# Patient Record
Sex: FEMALE | Employment: UNEMPLOYED | URBAN - METROPOLITAN AREA
[De-identification: names, ages, dates, MRNs, and addresses within clinical notes are randomized per-mention and may not be internally consistent; named-entity substitution may affect disease eponyms.]

---

## 2018-04-22 ENCOUNTER — HOSPITAL ENCOUNTER (EMERGENCY)
Facility: HOSPITAL | Age: 58
Discharge: HOME | End: 2018-04-22
Attending: EMERGENCY MEDICINE
Payer: MEDICAID

## 2018-04-22 VITALS
HEART RATE: 75 BPM | HEIGHT: 66 IN | TEMPERATURE: 99 F | RESPIRATION RATE: 18 BRPM | DIASTOLIC BLOOD PRESSURE: 85 MMHG | SYSTOLIC BLOOD PRESSURE: 143 MMHG | OXYGEN SATURATION: 99 % | WEIGHT: 181 LBS | BODY MASS INDEX: 29.09 KG/M2

## 2018-04-22 DIAGNOSIS — R42 DIZZINESS: ICD-10-CM

## 2018-04-22 LAB
ALBUMIN SERPL BCP-MCNC: 3.7 G/DL
ALP SERPL-CCNC: 73 U/L
ALT SERPL W/O P-5'-P-CCNC: 22 U/L
ANION GAP SERPL CALC-SCNC: 8 MMOL/L
AST SERPL-CCNC: 19 U/L
BASOPHILS # BLD AUTO: 0.01 K/UL
BASOPHILS NFR BLD: 0.3 %
BILIRUB SERPL-MCNC: 0.4 MG/DL
BILIRUB UR QL STRIP: NEGATIVE
BUN SERPL-MCNC: 8 MG/DL
CALCIUM SERPL-MCNC: 9.4 MG/DL
CHLORIDE SERPL-SCNC: 106 MMOL/L
CLARITY UR: CLEAR
CO2 SERPL-SCNC: 25 MMOL/L
COLOR UR: YELLOW
CREAT SERPL-MCNC: 0.8 MG/DL
DIFFERENTIAL METHOD: ABNORMAL
EOSINOPHIL # BLD AUTO: 0.1 K/UL
EOSINOPHIL NFR BLD: 3.1 %
ERYTHROCYTE [DISTWIDTH] IN BLOOD BY AUTOMATED COUNT: 13.8 %
EST. GFR  (AFRICAN AMERICAN): >60 ML/MIN/1.73 M^2
EST. GFR  (NON AFRICAN AMERICAN): >60 ML/MIN/1.73 M^2
GLUCOSE SERPL-MCNC: 111 MG/DL
GLUCOSE UR QL STRIP: NEGATIVE
HCT VFR BLD AUTO: 37 %
HGB BLD-MCNC: 12.4 G/DL
HGB UR QL STRIP: NEGATIVE
KETONES UR QL STRIP: NEGATIVE
LEUKOCYTE ESTERASE UR QL STRIP: NEGATIVE
LIPASE SERPL-CCNC: 21 U/L
LYMPHOCYTES # BLD AUTO: 1 K/UL
LYMPHOCYTES NFR BLD: 30.4 %
MCH RBC QN AUTO: 28.2 PG
MCHC RBC AUTO-ENTMCNC: 33.5 G/DL
MCV RBC AUTO: 84 FL
MICROSCOPIC COMMENT: NORMAL
MONOCYTES # BLD AUTO: 0.2 K/UL
MONOCYTES NFR BLD: 7.4 %
NEUTROPHILS # BLD AUTO: 1.9 K/UL
NEUTROPHILS NFR BLD: 58.8 %
NITRITE UR QL STRIP: NEGATIVE
PH UR STRIP: 5 [PH] (ref 5–8)
PLATELET # BLD AUTO: 150 K/UL
PMV BLD AUTO: 11.5 FL
POTASSIUM SERPL-SCNC: 4 MMOL/L
PROT SERPL-MCNC: 7.2 G/DL
PROT UR QL STRIP: NEGATIVE
RBC # BLD AUTO: 4.39 M/UL
SODIUM SERPL-SCNC: 139 MMOL/L
SP GR UR STRIP: 1.01 (ref 1–1.03)
SQUAMOUS #/AREA URNS HPF: 2 /HPF
URN SPEC COLLECT METH UR: NORMAL
UROBILINOGEN UR STRIP-ACNC: NEGATIVE EU/DL
WBC # BLD AUTO: 3.26 K/UL
WBC #/AREA URNS HPF: 2 /HPF (ref 0–5)

## 2018-04-22 PROCEDURE — 81000 URINALYSIS NONAUTO W/SCOPE: CPT

## 2018-04-22 PROCEDURE — 96360 HYDRATION IV INFUSION INIT: CPT

## 2018-04-22 PROCEDURE — 80053 COMPREHEN METABOLIC PANEL: CPT

## 2018-04-22 PROCEDURE — 93010 ELECTROCARDIOGRAM REPORT: CPT | Mod: ,,, | Performed by: INTERNAL MEDICINE

## 2018-04-22 PROCEDURE — 83690 ASSAY OF LIPASE: CPT

## 2018-04-22 PROCEDURE — 93005 ELECTROCARDIOGRAM TRACING: CPT

## 2018-04-22 PROCEDURE — 25000003 PHARM REV CODE 250: Performed by: PHYSICIAN ASSISTANT

## 2018-04-22 PROCEDURE — 99284 EMERGENCY DEPT VISIT MOD MDM: CPT | Mod: 25

## 2018-04-22 PROCEDURE — 85025 COMPLETE CBC W/AUTO DIFF WBC: CPT

## 2018-04-22 PROCEDURE — 96361 HYDRATE IV INFUSION ADD-ON: CPT

## 2018-04-22 RX ORDER — MECLIZINE HCL 12.5 MG 12.5 MG/1
12.5 TABLET ORAL 3 TIMES DAILY PRN
Qty: 20 TABLET | Refills: 0 | Status: SHIPPED | OUTPATIENT
Start: 2018-04-22

## 2018-04-22 RX ADMIN — SODIUM CHLORIDE 1000 ML: 9 INJECTION, SOLUTION INTRAVENOUS at 02:04

## 2018-04-22 NOTE — ED PROVIDER NOTES
"Encounter Date: 4/22/2018    SCRIBE #1 NOTE: I, Masha Cordero, am scribing for, and in the presence of,  Thai Torres MD. I have scribed the following portions of the note - Other sections scribed: HPI, ROS, PE.       History     Chief Complaint   Patient presents with    Dizziness     " dizziness and stomach upset" reports diarrhea last nighrt, reports 3 episodes of diarrhea last night      CC: Dizziness    HPI: 57 year old female presents to the ED c/o intermittent dizziness that has been ongoing for a couple of months. Pt reports episode of dizziness began this morning at 10:30am while she was cooking. Episode lasted for approximately 1 minute. The dizziness was alleviated after the pt sat down. Pt reports the dizziness feels like the room is spinning. She reports occassionally feeling light-headed, like she is going to fall. Pt denies taking any daily medications or recent OTC medications. No hx of DM or HTN. Pt denies fever, chills, chest pain, SOB, N/V/D, difficulty swallowing, speech difficulty, numbness, and weakness. No recent illnesses. Pt reports she is currently asymptomatic.      The history is provided by the patient. No  was used.     Review of patient's allergies indicates:   Allergen Reactions    Shellfish containing products      History reviewed. No pertinent past medical history.  History reviewed. No pertinent surgical history.  History reviewed. No pertinent family history.  Social History   Substance Use Topics    Smoking status: Never Smoker    Smokeless tobacco: Never Used    Alcohol use Yes      Comment: occasionally      Review of Systems   Constitutional: Negative for chills, diaphoresis and fever.   HENT: Negative for ear pain, sore throat and trouble swallowing.    Eyes: Negative for pain and visual disturbance.   Respiratory: Negative for cough and shortness of breath.    Cardiovascular: Negative for chest pain.   Gastrointestinal: Negative for " abdominal pain, diarrhea, nausea and vomiting.   Genitourinary: Negative for dysuria.   Musculoskeletal: Negative for back pain.   Skin: Negative for rash.   Neurological: Positive for dizziness and light-headedness. Negative for speech difficulty, weakness, numbness and headaches.       Physical Exam     Initial Vitals [04/22/18 1328]   BP Pulse Resp Temp SpO2   (!) 142/83 75 20 98.6 °F (37 °C) 96 %      MAP       102.67         Physical Exam    Nursing note and vitals reviewed.  Constitutional: She appears well-developed and well-nourished.  Non-toxic appearance. She does not appear ill.   HENT:   Head: Normocephalic and atraumatic.   Eyes: EOM are normal.   Minimal arcus senilis   Neck: Neck supple. No thyromegaly present. Carotid bruit is not present.   Cardiovascular: Normal rate and regular rhythm.   Pulmonary/Chest: Effort normal and breath sounds normal. No respiratory distress.   Abdominal: Soft. Normal appearance and bowel sounds are normal. She exhibits no distension. There is no tenderness.   Musculoskeletal: Normal range of motion.   Neurological: She is alert.   Skin: Skin is warm and dry.   Psychiatric: She has a normal mood and affect.         ED Course   Procedures  Labs Reviewed   CBC W/ AUTO DIFFERENTIAL - Abnormal; Notable for the following:        Result Value    WBC 3.26 (*)     Mono # 0.2 (*)     All other components within normal limits   COMPREHENSIVE METABOLIC PANEL - Abnormal; Notable for the following:     Glucose 111 (*)     All other components within normal limits   LIPASE   URINALYSIS   URINALYSIS MICROSCOPIC     EKG Readings: (Independently Interpreted)   Initial Reading: No STEMI. Rhythm: Normal Sinus Rhythm. Heart Rate: 67. Ectopy: No Ectopy. Conduction: Normal. ST Segments: Normal ST Segments. Axis: Normal. Clinical Impression: Normal Sinus Rhythm Other Impression: diffuse flat t waves          Medical Decision Making:   Intermittent and mostly positional. Less c/w cva. Not c/w  acute labyrinthitis.             Scribe Attestation:   Scribe #1: I performed the above scribed service and the documentation accurately describes the services I performed. I attest to the accuracy of the note.    Attending Attestation:           Physician Attestation for Scribe:  Physician Attestation Statement for Scribe #1: I, Thai Torres MD, reviewed documentation, as scribed by Masha Cordero in my presence, and it is both accurate and complete.                    Clinical Impression:   The encounter diagnosis was Dizziness.                           Thai Torres MD  04/22/18 [id number]

## 2018-04-22 NOTE — ED TRIAGE NOTES
Patient presents to the ER with daughter via personal vehicle. Patient presents with lightheadedness and dizziness. Reports nausea but denies vomiting. Symptoms have been going on for a week. Denies pain.

## 2024-12-19 ENCOUNTER — HOSPITAL ENCOUNTER (EMERGENCY)
Facility: OTHER | Age: 64
Discharge: HOME | End: 2024-12-19
Attending: EMERGENCY MEDICINE
Payer: MEDICAID

## 2024-12-19 VITALS
SYSTOLIC BLOOD PRESSURE: 163 MMHG | WEIGHT: 172 LBS | DIASTOLIC BLOOD PRESSURE: 76 MMHG | RESPIRATION RATE: 18 BRPM | TEMPERATURE: 98 F | OXYGEN SATURATION: 98 % | HEIGHT: 67 IN | HEART RATE: 61 BPM | BODY MASS INDEX: 27 KG/M2

## 2024-12-19 DIAGNOSIS — J02.0 STREP THROAT: Primary | ICD-10-CM

## 2024-12-19 LAB
CTP QC/QA: YES
CTP QC/QA: YES
GROUP A STREP, MOLECULAR: POSITIVE
POC MOLECULAR INFLUENZA A AGN: NEGATIVE
POC MOLECULAR INFLUENZA B AGN: NEGATIVE
SARS-COV-2 RDRP RESP QL NAA+PROBE: NEGATIVE

## 2024-12-19 PROCEDURE — 87635 SARS-COV-2 COVID-19 AMP PRB: CPT | Performed by: NURSE PRACTITIONER

## 2024-12-19 PROCEDURE — 99283 EMERGENCY DEPT VISIT LOW MDM: CPT

## 2024-12-19 PROCEDURE — 25000003 PHARM REV CODE 250: Performed by: NURSE PRACTITIONER

## 2024-12-19 PROCEDURE — 87651 STREP A DNA AMP PROBE: CPT | Performed by: EMERGENCY MEDICINE

## 2024-12-19 RX ORDER — PENICILLIN V POTASSIUM 500 MG/1
500 TABLET, FILM COATED ORAL 2 TIMES DAILY
Qty: 20 TABLET | Refills: 0 | Status: SHIPPED | OUTPATIENT
Start: 2024-12-19 | End: 2024-12-29

## 2024-12-19 RX ORDER — PENICILLIN V POTASSIUM 500 MG/1
500 TABLET, FILM COATED ORAL
Status: COMPLETED | OUTPATIENT
Start: 2024-12-19 | End: 2024-12-19

## 2024-12-19 RX ADMIN — PENICILLIN V POTASSIUM 500 MG: 500 TABLET, FILM COATED ORAL at 10:12

## 2024-12-19 NOTE — ED PROVIDER NOTES
Encounter Date: 12/19/2024       History     Chief Complaint   Patient presents with    General Illness     Sore throat, congestion x1.5 weeks.      Chief complaint: Upper respiratory infection symptoms     History of present illness:  Patient is a 64-year-old female who reports 1 week of congestion runny nose sinus pressure scratchy throat ear fullness occasional wheezing and shortness of breath due to upper airway congestion and constipation for the last 3 days.  She denies fever diarrhea nausea vomiting and all other symptoms.  She has been using tea, has taken 2 BC powders, and a single dose of strep throat antibiotic from her daughter.  She reports no resolution of her symptoms with these remedies.    The history is provided by the patient. No  was used.     Review of patient's allergies indicates:   Allergen Reactions    Shellfish containing products      History reviewed. No pertinent past medical history.  History reviewed. No pertinent surgical history.  No family history on file.  Social History     Tobacco Use    Smoking status: Never    Smokeless tobacco: Never   Substance Use Topics    Alcohol use: Yes     Comment: occasionally     Drug use: No     Review of Systems   HENT:  Positive for congestion, ear pain, rhinorrhea, sinus pressure and sore throat.    Respiratory:  Positive for shortness of breath and wheezing.    Gastrointestinal:  Positive for constipation.       Physical Exam     Initial Vitals [12/19/24 0910]   BP Pulse Resp Temp SpO2   137/79 64 19 98.2 °F (36.8 °C) 98 %      MAP       --         Physical Exam    Nursing note and vitals reviewed.  Constitutional: She appears well-developed and well-nourished.   HENT:   Head: Normocephalic and atraumatic.   Right Ear: Hearing, tympanic membrane, external ear and ear canal normal.   Left Ear: Hearing, tympanic membrane, external ear and ear canal normal.   Nose: Nose normal. No mucosal edema or rhinorrhea. No epistaxis. Right  sinus exhibits no maxillary sinus tenderness and no frontal sinus tenderness. Left sinus exhibits no maxillary sinus tenderness and no frontal sinus tenderness. Mouth/Throat: Uvula is midline, oropharynx is clear and moist and mucous membranes are normal. No oral lesions. Normal dentition.   Eyes: Conjunctivae and EOM are normal. Pupils are equal, round, and reactive to light. Right eye exhibits no discharge. Left eye exhibits no discharge.   Neck:   Normal range of motion.  Cardiovascular:  Regular rhythm, S1 normal, S2 normal and normal heart sounds.     Exam reveals no gallop.       No murmur heard.  Pulmonary/Chest: Effort normal and breath sounds normal. No respiratory distress. She has no decreased breath sounds. She has no wheezes. She has no rhonchi. She has no rales.   Abdominal: She exhibits no distension.   Musculoskeletal:         General: Normal range of motion.      Cervical back: Normal range of motion.     Neurological: She is alert and oriented to person, place, and time.   Skin: Skin is dry. Capillary refill takes less than 2 seconds.         ED Course   Procedures  Labs Reviewed   GROUP A STREP, MOLECULAR - Abnormal       Result Value    Group A Strep, Molecular Positive (*)    SARS-COV-2 RDRP GENE    POC Rapid COVID Negative       Acceptable Yes     POCT INFLUENZA A/B MOLECULAR    POC Molecular Influenza A Ag Negative      POC Molecular Influenza B Ag Negative       Acceptable Yes            Imaging Results    None          Medications   penicillin v potassium tablet 500 mg (500 mg Oral Given 12/19/24 1059)     Medical Decision Making  Patient is a 64-year-old female who reports 1 week of congestion runny nose sinus pressure scratchy throat ear fullness occasional wheezing and shortness of breath due to upper airway congestion and constipation for the last 3 days.  She denies fever diarrhea nausea vomiting and all other symptoms.  She has been using tea, has taken 2  "BC powders, and a single dose of strep throat antibiotic from her daughter.  She reports no resolution of her symptoms with these remedies.    On physical examination the patient is afebrile nontoxic in no apparent distress breath sounds are clear to auscultation heart sounds without abnormality skin warm dry and intact.  Normal HEENT exam.      Differential diagnosis includes COVID-19 influenza strep throat common cold    Problems Addressed:  Strep throat: acute illness or injury     Details: Patient opted for p.o. antibiotics penicillin VK was given in the ER she was discharged on same.    Amount and/or Complexity of Data Reviewed  Labs: ordered. Decision-making details documented in ED Course.  Discussion of management or test interpretation with external provider(s): Vital signs at the time of disposition were:  /79 (BP Location: Left arm)   Pulse 64   Temp 98.2 °F (36.8 °C) (Oral)   Resp 19   Ht 5' 7" (1.702 m)   Wt 78 kg (172 lb)   SpO2 98%   BMI 26.94 kg/m²       See AVS for additional recommendations. Medications listed herein were prescribed after reviewing the patient's allergies, medication list, history, most recent laboratories as available.  Referrals below were provided after reviewing the patient's previous medical providers. She understands she  should return for any worsening or changes in condition.  Prior to discharge the patient was asked if she  had any additional concerns or complaints and she declined. The patient was given an opportunity to ask questions and all were answered to her satisfaction.     Risk  OTC drugs.  Prescription drug management.  Diagnosis or treatment significantly limited by social determinants of health.               ED Course as of 12/19/24 2052   u Dec 19, 2024   0934 BP: 137/79 [VC]   0934 Temp: 98.2 °F (36.8 °C) [VC]   0934 Temp Source: Oral [VC]   0934 Pulse: 64 [VC]   0934 Resp: 19 [VC]   0934 SpO2: 98 % [VC]   0947 Group A Strep, Molecular(!): " Positive [VC]   1031 POC Molecular Influenza A Ag: Negative [VC]   1031 POC Molecular Influenza B Ag: Negative [VC]   1031 SARS-CoV-2 RNA, Amplification, Qual: Negative [VC]      ED Course User Index  [VC] Ga Krishnamurthy DNP                           Clinical Impression:  Final diagnoses:  [J02.0] Strep throat (Primary)          ED Disposition Condition    Discharge Stable          ED Prescriptions       Medication Sig Dispense Start Date End Date Auth. Provider    penicillin v potassium (VEETID) 500 MG tablet Take 1 tablet (500 mg total) by mouth 2 (two) times a day. for 10 days 20 tablet 12/19/2024 12/29/2024 Ga Krishnamurthy DNP          Follow-up Information       Follow up With Specialties Details Why Contact [facility] -  Schedule an appointment as soon as possible for a visit   [address]  [place] [address]  [phone]               Ga Krishnamurthy DNP  12/19/24 1031       Ga Krishnamurthy DNP  12/19/24 2052

## 2024-12-19 NOTE — ED TRIAGE NOTES
Pt reports that for the past week she was congested, ear fullness, and scratchy throat. She denies fever and took an antibiotic that her daughter had.

## 2024-12-19 NOTE — DISCHARGE INSTRUCTIONS
Take antibiotics as ordered.     Cepacol Lozenges as directed on package.  Warm fluids and warm saltwater gargles.         Return to the Emergency Department for any worsening, change in condition, or any emergent concerns.

## 2024-12-19 NOTE — Clinical Note
"Antwan Trimbleradha Fernandez was seen and treated in our emergency department on 12/19/2024.  She may return to work on 12/23/2024.       If you have any questions or concerns, please don't hesitate to call.      Ga Krishnamurthy, DNP"

## 2024-12-19 NOTE — FIRST PROVIDER EVALUATION
Emergency Department TeleTriage Encounter Note      CHIEF COMPLAINT    Chief Complaint   Patient presents with    General Illness     Sore throat, congestion x1.5 weeks.        VITAL SIGNS   Initial Vitals [12/19/24 0910]   BP Pulse Resp Temp SpO2   137/79 64 19 98.2 °F (36.8 °C) 98 %      MAP       --            ALLERGIES    Review of patient's allergies indicates:   Allergen Reactions    Shellfish containing products        PROVIDER TRIAGE NOTE  This is a teletriage evaluation of a 64 y.o. female presenting to the ED complaining of general illness. Patient reports sore throat, congestion, cough, sinus pressure. Patient took OTC NSAIDs and an unknown antibiotic.     Patient is alert and oriented. She speaks in complete sentences. She is sitting upright in the chair in no distress.       Initial orders will be placed and care will be transferred to an alternate provider when patient is roomed for a full evaluation. Any additional orders and the final disposition will be determined by that provider.         ORDERS  Labs Reviewed   SARS-COV-2 RDRP GENE   POCT INFLUENZA A/B MOLECULAR   POCT STREP A MOLECULAR       ED Orders (720h ago, onward)      Start Ordered     Status Ordering Provider    12/19/24 0912 12/19/24 0911  POCT COVID-19 Rapid Screening  Once         Ordered KELLIE DANIELSNE A.    12/19/24 0912 12/19/24 0911  POCT Influenza A/B Molecular  Once         Ordered FRANCES ESTRELLA A.    12/19/24 0912 12/19/24 0911  POCT Strep A, Molecular  Once         Ordered FRANCES ESTRELLA JEFF.              Virtual Visit Note: The provider triage portion of this emergency department evaluation and documentation was performed via [employer], a HIPAA-compliant telemedicine application, in concert with a tele-presenter in the room. A face to face patient evaluation with one of my colleagues will occur once the patient is placed in an emergency department room.      DISCLAIMER: This note was prepared with M*Modal voice  recognition transcription software. Garbled syntax, mangled pronouns, and other bizarre constructions may be attributed to that software system.

## 2025-04-27 ENCOUNTER — HOSPITAL ENCOUNTER (EMERGENCY)
Facility: OTHER | Age: 65
Discharge: HOME | End: 2025-04-27
Attending: EMERGENCY MEDICINE
Payer: MEDICAID

## 2025-04-27 VITALS
WEIGHT: 170 LBS | DIASTOLIC BLOOD PRESSURE: 79 MMHG | HEIGHT: 66 IN | OXYGEN SATURATION: 97 % | SYSTOLIC BLOOD PRESSURE: 142 MMHG | TEMPERATURE: 98 F | BODY MASS INDEX: 27.32 KG/M2 | HEART RATE: 60 BPM | RESPIRATION RATE: 18 BRPM

## 2025-04-27 DIAGNOSIS — R51.9 FRONTAL HEADACHE: Primary | ICD-10-CM

## 2025-04-27 PROCEDURE — 96372 THER/PROPH/DIAG INJ SC/IM: CPT | Performed by: EMERGENCY MEDICINE

## 2025-04-27 PROCEDURE — 63600175 PHARM REV CODE 636 W HCPCS: Mod: JZ,TB | Performed by: EMERGENCY MEDICINE

## 2025-04-27 PROCEDURE — 99284 EMERGENCY DEPT VISIT MOD MDM: CPT | Mod: 25

## 2025-04-27 RX ORDER — METOCLOPRAMIDE 10 MG/1
10 TABLET ORAL EVERY 6 HOURS PRN
Qty: 20 TABLET | Refills: 0 | Status: SHIPPED | OUTPATIENT
Start: 2025-04-27

## 2025-04-27 RX ORDER — BUTALBITAL, ACETAMINOPHEN AND CAFFEINE 50; 325; 40 MG/1; MG/1; MG/1
1 TABLET ORAL EVERY 4 HOURS PRN
Qty: 15 TABLET | Refills: 0 | Status: SHIPPED | OUTPATIENT
Start: 2025-04-27 | End: 2025-05-27

## 2025-04-27 RX ORDER — IBUPROFEN 600 MG/1
600 TABLET ORAL EVERY 6 HOURS PRN
Qty: 20 TABLET | Refills: 0 | Status: SHIPPED | OUTPATIENT
Start: 2025-04-27

## 2025-04-27 RX ORDER — KETOROLAC TROMETHAMINE 30 MG/ML
30 INJECTION, SOLUTION INTRAMUSCULAR; INTRAVENOUS
Status: COMPLETED | OUTPATIENT
Start: 2025-04-27 | End: 2025-04-27

## 2025-04-27 RX ADMIN — KETOROLAC TROMETHAMINE 30 MG: 30 INJECTION, SOLUTION INTRAMUSCULAR at 05:04

## 2025-04-27 NOTE — DISCHARGE INSTRUCTIONS
I would also recommend using Zyrtec over-the-counter.  10 mg daily.    Follow up with your primary care physician for re-evaluation.

## 2025-04-27 NOTE — ED TRIAGE NOTES
Pt presented to the ed c/o headache that started a few day ago pt is aaox4 with no acute distress noted

## 2025-04-27 NOTE — Clinical Note
"Antwan Huang" Jim was seen and treated in our emergency department on 4/27/2025.  She may return to work on 05/02/2025.       If you have any questions or concerns, please don't hesitate to call.      raul ALARCON    "

## 2025-04-27 NOTE — ED PROVIDER NOTES
"     Source of History:  The patient    Chief complaint:  Migraine (Patient presents to the ED with complaints of having a frontal migraine headache that started x 2 days ago. Symptoms include sensitivity to the light and states having had N/V when pain started x 2 days ago. )      HPI:  Antwan Fernandez is a 64 y.o. female  who complains of frontal headache over the last 2 days.  States she has a history of sinusitis concerned of this.  Denies any recent URI symptoms.  No purulent discharge noted.  No ear pain.  States she is sensitive to light.  No fevers or chills.    This is the extent to the patients complaints today here in the emergency department.    ROS:   See HPI.    Review of patient's allergies indicates:   Allergen Reactions    Shellfish containing products        PMH:  As per HPI and below:  No past medical history on file.  No past surgical history on file.    Social History[1]    Physical Exam:    BP (!) 159/94 (BP Location: Right arm)   Pulse 69   Temp 98.1 °F (36.7 °C) (Oral)   Resp 18   Ht 5' 6" (1.676 m)   Wt 77.1 kg (170 lb)   SpO2 97%   BMI 27.44 kg/m²   Nursing note and vital signs reviewed.  Constitutional:  Pleasant and smiling with easy conversation No acute distress.  Nontoxic  Eyes: No conjunctival injection.  Extraocular muscles are intact.  Pupils are equally round reactive to light.  ENT:  Tenderness to palpation over the frontal sinuses and forehead.  Skin: No rashes seen.    Neuro: alert and oriented x3      I decided to obtain the patient's medical records.  Summary of Medical Records:        Differential Dx/ MDM/ Workup:  I believe the patient has an unspecified headache based upon the history and physical exam.  Likely musculoskeletal.  The patient has no focal weakness, numbness, meningismus, other focal neurologic deficit, history of trauma, fevers, elevated blood pressure to suggest meningitis, subarachnoid hemorrhage, TIA, stroke, mass, or hypertensive urgency. I do not " feel a CT of the brain or blood work are necessary at this time.    Patient did ask about needing for antibiotics.  Even if this was a sinusitis symptoms have all been going on for 2 days.  I did discuss with her antibiotic stewardship and recommendations and she understands and agrees with the plan of care.                     Diagnostic Impression:    1. Frontal headache         ED Disposition Condition    Discharge Stable            ED Prescriptions       Medication Sig Dispense Start Date End Date Auth. Provider    ibuprofen (ADVIL,MOTRIN) 600 MG tablet Take 1 tablet (600 mg total) by mouth every 6 (six) hours as needed for Pain. 20 tablet 4/27/2025 -- José Miguel Mccormick, DO    metoclopramide HCl (REGLAN) 10 MG tablet Take 1 tablet (10 mg total) by mouth every 6 (six) hours as needed (headache). 20 tablet 4/27/2025 -- José Miguel Mccormick, DO    butalbital-acetaminophen-caffeine -40 mg (FIORICET, ESGIC) -40 mg per tablet Take 1 tablet by mouth every 4 (four) hours as needed for Pain. 15 tablet 4/27/2025 5/27/2025 José Miguel Mccormick, DO          Follow-up Information    None            [1]   Social History  Tobacco Use    Smoking status: Never    Smokeless tobacco: Never   Substance Use Topics    Alcohol use: Yes     Comment: occasionally     Drug use: No        José Miguel Mccormick,   04/27/25 0570

## 2025-05-10 ENCOUNTER — HOSPITAL ENCOUNTER (EMERGENCY)
Facility: OTHER | Age: 65
Discharge: HOME | End: 2025-05-10
Attending: EMERGENCY MEDICINE
Payer: MEDICAID

## 2025-05-10 VITALS
RESPIRATION RATE: 18 BRPM | HEART RATE: 67 BPM | SYSTOLIC BLOOD PRESSURE: 169 MMHG | HEIGHT: 66 IN | DIASTOLIC BLOOD PRESSURE: 70 MMHG | BODY MASS INDEX: 27 KG/M2 | WEIGHT: 168 LBS | OXYGEN SATURATION: 100 % | TEMPERATURE: 98 F

## 2025-05-10 DIAGNOSIS — R51.9 NONINTRACTABLE EPISODIC HEADACHE, UNSPECIFIED HEADACHE TYPE: Primary | ICD-10-CM

## 2025-05-10 PROCEDURE — 25000003 PHARM REV CODE 250: Performed by: EMERGENCY MEDICINE

## 2025-05-10 PROCEDURE — 99284 EMERGENCY DEPT VISIT MOD MDM: CPT | Mod: 25

## 2025-05-10 RX ORDER — CETIRIZINE HYDROCHLORIDE 10 MG/1
10 TABLET ORAL DAILY
Qty: 30 TABLET | Refills: 0 | Status: SHIPPED | OUTPATIENT
Start: 2025-05-10 | End: 2026-05-10

## 2025-05-10 RX ORDER — CETIRIZINE HYDROCHLORIDE 5 MG/1
10 TABLET ORAL
Status: COMPLETED | OUTPATIENT
Start: 2025-05-10 | End: 2025-05-10

## 2025-05-10 RX ORDER — KETOROLAC TROMETHAMINE 30 MG/ML
30 INJECTION, SOLUTION INTRAMUSCULAR; INTRAVENOUS
Status: DISCONTINUED | OUTPATIENT
Start: 2025-05-10 | End: 2025-05-10 | Stop reason: HOSPADM

## 2025-05-10 RX ADMIN — CETIRIZINE HYDROCHLORIDE 10 MG: 5 TABLET ORAL at 01:05

## 2025-05-10 NOTE — Clinical Note
"Antwan Trimbleradha Fernandez was seen and treated in our emergency department on 5/9/2025.  She may return to work on 05/12/2025.       If you have any questions or concerns, please don't hesitate to call.      Trish Dexter LPN    "

## 2025-05-10 NOTE — ED PROVIDER NOTES
Encounter Date: 5/9/2025    SCRIBE #1 NOTE: I, Sandra Child, am scribing for, and in the presence of,  Lopez Enriquez MD. I have scribed the following portions of the note - Other sections scribed: HPI, ROS, PE.       History     Chief Complaint   Patient presents with    Back Pain    Headache     Pt c/o non-traumatic R back pain and H/A. No OTC meds taken today. No other complaints. Pt A/O x 4 w/ ABCs intact, NAD. VSS.      This is a 64 y.o. female who presents with complaint of an headache that started 2 days ago. Pt was seen here on 04/27 for the same complaint and was administered a Toradol shot and prescribed two medications. Pt states she was doing fine ever since then but the headaches came back two nights ago and the medication is no longer helping her. She notes that she wakes up every morning coughing up mucous as well, but denies any current sinus congestion or pressure. She notes the headache is in the front part of her head and describes it as a throbbing feeling. She mentions blurred vision, nausea, postnasal drip, and photophobia, but denies any other symptoms. She mentions that she takes baby Aspirin daily but no other medications.  Patient denies any other complaints at this time.      he  Review of patient's allergies indicates:   Allergen Reactions    Shellfish containing products      History reviewed. No pertinent past medical history.  History reviewed. No pertinent surgical history.  No family history on file.  Social History[1]  Review of Systems   Constitutional:  Negative for fever.   HENT:  Positive for postnasal drip. Negative for congestion.    Eyes:  Positive for photophobia and visual disturbance. Negative for redness.   Respiratory:  Positive for cough. Negative for shortness of breath.    Cardiovascular:  Negative for chest pain.   Gastrointestinal:  Positive for nausea. Negative for abdominal pain.   Genitourinary:  Negative for dysuria.   Skin:  Negative for rash.    Neurological:  Positive for headaches.   Psychiatric/Behavioral:  Negative for confusion.        Physical Exam     Initial Vitals [05/10/25 0003]   BP Pulse Resp Temp SpO2   119/72 80 18 97.6 °F (36.4 °C) 96 %      MAP       --         Physical Exam    Constitutional: She appears well-developed and well-nourished. She is not diaphoretic. No distress.   HENT:   Head: Normocephalic and atraumatic.   Eyes: Conjunctivae are normal.   Neck: Neck supple.   Cardiovascular:  Normal rate, regular rhythm, S1 normal, S2 normal, normal heart sounds and intact distal pulses.           No murmur heard.  Pulmonary/Chest: Breath sounds normal. No respiratory distress. She has no wheezes. She has no rhonchi. She has no rales.   Abdominal: There is no abdominal tenderness.   Musculoskeletal:         General: No edema.      Cervical back: Neck supple.     Neurological: She is alert and oriented to person, place, and time. She has normal strength. No cranial nerve deficit.   Skin: Skin is warm and dry.   Psychiatric: She has a normal mood and affect.         ED Course   Procedures  Labs Reviewed - No data to display       Imaging Results              CT Head Without Contrast (Final result)  Result time 05/10/25 01:40:18      Final result by Luca Noonan MD (05/10/25 01:40:18)                   Impression:      No acute intracranial abnormalities.    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  Nonemergent outpatient MRI can be performed for further evaluation.      Electronically signed by: Luca Noonan MD  Date:    05/10/2025  Time:    01:40               Narrative:    EXAMINATION:  CT HEAD WITHOUT CONTRAST    CLINICAL HISTORY:  Headache, new or worsening (Age >= 50y);    TECHNIQUE:  Low dose axial images were obtained through the head.  Coronal and sagittal reformations were also performed. Contrast was not  administered.    COMPARISON:  None.    FINDINGS:  The brain parenchyma appears normal for age with good corticomedullary differentiation.  Partial empty sella configuration.  There is no evidence of acute infarct, hemorrhage, or mass.  The ventricular system is normal in size.  No mass-effect or midline shift.  There are no abnormal extra-axial fluid collections.  The paranasal sinuses and mastoid air cells are clear.  The calvarium appears intact. 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                                       Medications   cetirizine tablet 10 mg (10 mg Oral Given 5/10/25 0104)     Medical Decision Making      64-year-old female with no comorbidities presents for evaluation of recurrent frontal headache for the past 2 days, described as a throbbing at the front of her head associated with mild nausea/photophobia.  Patient was seen here for similar headache 2 weeks ago, had relief with Toradol injection and was doing well until recurrence 2 days ago.  Prior to these episodes, she denies any significant similar headache history.  She also reports occasional postnasal drip and productive cough in the morning, but no fevers or SOB.  On arrival patient resting comfortably with normal neuro exam, no other concerning exam findings, no sinus tenderness.  Differential diagnosis includes migrainous headache, sinus headache, tension headache.      Since patient has not had any brain imaging for these headaches, CT head done with no acute intracranial process, but does show lesion in right anterior maxilla, outpatient MRI for follow up advised.  Patient advised of this finding and will follow up with PCP for outpatient MRI.  After Toradol and Zyrtec she feels her headache is much improved, remained resting comfortably.  Stable for discharge with further supportive care for recurrent headaches, unclear whether it may be related to  allergies and congestion, versus tension or migrainous headache.  Will start Zyrtec daily and continue previously prescribed medications ibuprofen/Fioricet for any recurrent headaches, and she is advised on need for follow up and return precautions.        Amount and/or Complexity of Data Reviewed  External Data Reviewed: radiology and notes.  Radiology: ordered and independent interpretation performed. Decision-making details documented in ED Course.    Risk  OTC drugs.  Prescription drug management.            Scribe Attestation:   Scribe #1: I performed the above scribed service and the documentation accurately describes the services I performed. I attest to the accuracy of the note.              I, Dr. Lopez Enriquez, personally performed the services described in this documentation. All medical record entries made by the scribe were at my direction and in my presence.  I have reviewed the chart and agree that the record reflects my personal performance and is accurate and complete. Lopez Enriquez MD.                    Clinical Impression:  Final diagnoses:  [R51.9] Nonintractable episodic headache, unspecified headache type (Primary)          ED Disposition Condition    Discharge Stable          ED Prescriptions       Medication Sig Dispense Start Date End Date Auth. Provider    cetirizine (ZYRTEC) 10 MG tablet Take 1 tablet (10 mg total) by mouth once daily. 30 tablet 5/10/2025 5/10/2026 Lopez Enriquez MD          Follow-up Information       Follow up With Specialties Details Why Contact Info    [facility], [place] Emergency Dept Emergency Medicine Go to  If symptoms worsen [address] [id number] [phone]                 [1]   Social History  Tobacco Use    Smoking status: Never    Smokeless tobacco: Never   Substance Use Topics    Alcohol use: Yes     Comment: occasionally     Drug use: No        Lopez Enriquez MD  05/10/25 0700

## 2025-05-17 ENCOUNTER — HOSPITAL ENCOUNTER (EMERGENCY)
Facility: OTHER | Age: 65
Discharge: HOME | End: 2025-05-17
Attending: EMERGENCY MEDICINE
Payer: MEDICAID

## 2025-05-17 VITALS
SYSTOLIC BLOOD PRESSURE: 136 MMHG | TEMPERATURE: 98 F | RESPIRATION RATE: 13 BRPM | WEIGHT: 165 LBS | HEIGHT: 65 IN | DIASTOLIC BLOOD PRESSURE: 65 MMHG | HEART RATE: 57 BPM | OXYGEN SATURATION: 97 % | BODY MASS INDEX: 27.49 KG/M2

## 2025-05-17 DIAGNOSIS — R07.9 CHEST PAIN: ICD-10-CM

## 2025-05-17 DIAGNOSIS — R53.83 FATIGUE, UNSPECIFIED TYPE: ICD-10-CM

## 2025-05-17 DIAGNOSIS — R07.89 ATYPICAL CHEST PAIN: Primary | ICD-10-CM

## 2025-05-17 LAB
ABSOLUTE EOSINOPHIL (OHS): 0.25 K/UL
ABSOLUTE MONOCYTE (OHS): 0.26 K/UL (ref 0.3–1)
ABSOLUTE NEUTROPHIL COUNT (OHS): 1.53 K/UL (ref 1.8–7.7)
ALBUMIN SERPL BCP-MCNC: 3.6 G/DL (ref 3.5–5.2)
ALP SERPL-CCNC: 64 UNIT/L (ref 40–150)
ALT SERPL W/O P-5'-P-CCNC: 15 UNIT/L (ref 10–44)
ANION GAP (OHS): 11 MMOL/L (ref 8–16)
AST SERPL-CCNC: 17 UNIT/L (ref 11–45)
BASOPHILS # BLD AUTO: 0.01 K/UL
BASOPHILS NFR BLD AUTO: 0.3 %
BILIRUB SERPL-MCNC: 0.3 MG/DL (ref 0.1–1)
BNP SERPL-MCNC: 18 PG/ML (ref 0–99)
BUN SERPL-MCNC: 8 MG/DL (ref 8–23)
CALCIUM SERPL-MCNC: 8.7 MG/DL (ref 8.7–10.5)
CHLORIDE SERPL-SCNC: 106 MMOL/L (ref 95–110)
CO2 SERPL-SCNC: 23 MMOL/L (ref 23–29)
CREAT SERPL-MCNC: 0.7 MG/DL (ref 0.5–1.4)
ERYTHROCYTE [DISTWIDTH] IN BLOOD BY AUTOMATED COUNT: 13.5 % (ref 11.5–14.5)
GFR SERPLBLD CREATININE-BSD FMLA CKD-EPI: >60 ML/MIN/1.73/M2
GLUCOSE SERPL-MCNC: 89 MG/DL (ref 70–110)
HCT VFR BLD AUTO: 38.4 % (ref 37–48.5)
HGB BLD-MCNC: 12.1 GM/DL (ref 12–16)
IMM GRANULOCYTES # BLD AUTO: 0 K/UL (ref 0–0.04)
IMM GRANULOCYTES NFR BLD AUTO: 0 % (ref 0–0.5)
LYMPHOCYTES # BLD AUTO: 1.31 K/UL (ref 1–4.8)
MCH RBC QN AUTO: 27.9 PG (ref 27–31)
MCHC RBC AUTO-ENTMCNC: 31.5 G/DL (ref 32–36)
MCV RBC AUTO: 89 FL (ref 82–98)
NUCLEATED RBC (/100WBC) (OHS): 0 /100 WBC
PLATELET # BLD AUTO: 167 K/UL (ref 150–450)
PMV BLD AUTO: 11.6 FL (ref 9.2–12.9)
POTASSIUM SERPL-SCNC: 4.1 MMOL/L (ref 3.5–5.1)
PROT SERPL-MCNC: 7.2 GM/DL (ref 6–8.4)
RBC # BLD AUTO: 4.34 M/UL (ref 4–5.4)
RELATIVE EOSINOPHIL (OHS): 7.4 %
RELATIVE LYMPHOCYTE (OHS): 39 % (ref 18–48)
RELATIVE MONOCYTE (OHS): 7.7 % (ref 4–15)
RELATIVE NEUTROPHIL (OHS): 45.6 % (ref 38–73)
SODIUM SERPL-SCNC: 140 MMOL/L (ref 136–145)
TROPONIN I SERPL DL<=0.01 NG/ML-MCNC: <0.006 NG/ML
TROPONIN I SERPL DL<=0.01 NG/ML-MCNC: <0.006 NG/ML
WBC # BLD AUTO: 3.36 K/UL (ref 3.9–12.7)

## 2025-05-17 PROCEDURE — 82040 ASSAY OF SERUM ALBUMIN: CPT | Performed by: PHYSICIAN ASSISTANT

## 2025-05-17 PROCEDURE — 93005 ELECTROCARDIOGRAM TRACING: CPT

## 2025-05-17 PROCEDURE — 85025 COMPLETE CBC W/AUTO DIFF WBC: CPT | Performed by: PHYSICIAN ASSISTANT

## 2025-05-17 PROCEDURE — 99285 EMERGENCY DEPT VISIT HI MDM: CPT | Mod: 25

## 2025-05-17 PROCEDURE — 83880 ASSAY OF NATRIURETIC PEPTIDE: CPT | Performed by: PHYSICIAN ASSISTANT

## 2025-05-17 PROCEDURE — 93010 ELECTROCARDIOGRAM REPORT: CPT | Mod: ,,, | Performed by: INTERNAL MEDICINE

## 2025-05-17 PROCEDURE — 84484 ASSAY OF TROPONIN QUANT: CPT | Performed by: PHYSICIAN ASSISTANT

## 2025-05-17 NOTE — Clinical Note
"Antwan Huang" Jim was seen and treated in our emergency department on 5/17/2025.  She may return to work on 05/20/2025.       If you have any questions or concerns, please don't hesitate to call.       LPN    "

## 2025-05-17 NOTE — ED PROVIDER NOTES
Chief complaint:  Chest Pain (Mid sternal cp that began 1 day ago and has presently resolved. Pt reports feeling hot and waking up diaphoretic with onset of pain. All s/s have resolved except fatigue. )      Source of information:  Patient, old chart    HPI:  Antwan Fernandez is a 64 y.o. female presenting with concern for episode of chest pain.  She was starting to go to sleep last night when she felt a sudden pain that lasted less than 5 minutes in the center of her chest.  She describes it as if something was breaking there.  Not pressure-like or sharp nor burning.  She felt hot, flushed preceding it, felt sweaty.  But it resolved quickly and she went back to sleep.  Today she has felt only unusually tired but no further episodes of chest pain.  She does relate she has started a new job which has made her feel tired throughout the day but otherwise no change in her routine.  No recent exertional chest pain or other symptoms.  She does report a a treadmill stress test a couple years ago which was normal.  No other acute complaints    ROS: As per HPI    Review of patient's allergies indicates:   Allergen Reactions    Shellfish containing products        Medications Ordered Prior to Encounter[1]    PMH:  As per HPI and below:  History reviewed. No pertinent past medical history.  History reviewed. No pertinent surgical history.      Physical Exam:    Vitals:    05/17/25 2011   BP:    Pulse: (!) 57   Resp: 13   Temp:        General: No acute distress. Well developed. Well nourished.  Eyes: PERRL. EOM intact. no photophobia, no nystagmus  Conjunctivae - no pallor or icterus.   ENT: HEAD: Normal - atraumatic. Normal external ears. Normal nose.  No facial asymmetry. Mucous membranes - moist.  Neck: Neck supple.   Cardiovascular: Regular rate and rhythm. Normal S1 and S2. No murmur. No gallop. No rub.  2+ peripheral pulses  Respiratory: Normal breath sounds. No rales. No rhonchi. No wheezes. No tachypnea with  exertion.  Musculoskeletal:  Normal weight-bearing and gait No deformities. Normal ROM x4.   Integument: No acute skin rashes. No clubbing or cyanosis  Neurologic: No gross neurological deficits.    Psychiatric: Awake, alert.  Oriented x3.  Normal speech and mentation.        Labs Reviewed   CBC WITH DIFFERENTIAL - Abnormal       Result Value    WBC 3.36 (*)     RBC 4.34      HGB 12.1      HCT 38.4      MCV 89      MCH 27.9      MCHC 31.5 (*)     RDW 13.5      Platelet Count 167      MPV 11.6      Nucleated RBC 0      Neut % 45.6      Lymph % 39.0      Mono % 7.7      Eos % 7.4      Basophil % 0.3      Imm Grans % 0.0      Neut # 1.53 (*)     Lymph # 1.31      Mono # 0.26 (*)     Eos # 0.25      Baso # 0.01      Imm Grans # 0.00     COMPREHENSIVE METABOLIC PANEL - Normal    Sodium 140      Potassium 4.1      Chloride 106      CO2 23      Glucose 89      BUN 8      Creatinine 0.7      Calcium 8.7      Protein Total 7.2      Albumin 3.6      Bilirubin Total 0.3      ALP 64      AST 17      ALT 15      Anion Gap 11      eGFR >60     TROPONIN I - Normal    Troponin-I <0.006     B-TYPE NATRIURETIC PEPTIDE - Normal    BNP 18     CBC W/ AUTO DIFFERENTIAL    Narrative:     The following orders were created for panel order CBC auto differential.                  Procedure                               Abnormality         Status                                     ---------                               -----------         ------                                     CBC with Differential[[id number]]       Abnormal            Final result                                                 Please view results for these tests on the individual orders.   TROPONIN I       Medications - No data to display    Medical Decision Making  Differential diagnosis includes ACS, pericarditis, pneumonia, pneumothorax, atypical chest pain, musculoskeletal.    Amount and/or Complexity of Data Reviewed  External Data Reviewed: ECG and notes.  Labs:  ordered. Decision-making details documented in ED Course.  Radiology: ordered and independent interpretation performed. Decision-making details documented in ED Course.  ECG/medicine tests: ordered and independent interpretation performed. Decision-making details documented in ED Course.    Risk  Decision regarding hospitalization.      .md    Independently interpreted x-ray and/or EKG:  Twelve lead EKG shows sinus rhythm, rate of 60.  No ST or T-wave changes.  No ischemia arrhythmia or pericarditis.  No S1 Q 3 T3 pattern.  Chest x-ray shows no focal infiltrate or effusion, no pulmonary edema.  Normal cardiac silhouette.    MDM:    64 y.o. female with single episode of chest pain last night that was nonexertional.  She reports some family history of cardiac disease but she personally has little risk factors.  EKG was unremarkable.  Workup was initiated from triage included chest x-ray which does not show any acute findings, CBC and chemistries were unremarkable, and troponin was negative.  She had a single event last night without recurrence.  I do not think admission for serial enzymes are warranted.  Discussed with her that the etiology is not clear but clinical picture and workup suggests against cardiac or pulmonary etiology.  She does complain of fatigue and also notes difficulty sleeping at night lately.  Recommend magnesium and/or melatonin.  As well as follow up with the primary care for further evaluation of the fatigue.  The patient may follow up with her cardiologist should she have further similar episodes of chest pain.  Understands return precautions for the interim.    Medications - No data to display    ASSESSMENT:   1. Atypical chest pain    2. Chest pain    3. Fatigue, unspecified type               [1]   No current facility-administered medications on file prior to encounter.     Current Outpatient Medications on File Prior to Encounter   Medication Sig Dispense Refill     butalbital-acetaminophen-caffeine -40 mg (FIORICET, ESGIC) -40 mg per tablet Take 1 tablet by mouth every 4 (four) hours as needed for Pain. 15 tablet 0    cetirizine (ZYRTEC) 10 MG tablet Take 1 tablet (10 mg total) by mouth once daily. 30 tablet 0    ibuprofen (ADVIL,MOTRIN) 600 MG tablet Take 1 tablet (600 mg total) by mouth every 6 (six) hours as needed for Pain. 20 tablet 0    meclizine (ANTIVERT) 12.5 mg tablet Take 1 tablet (12.5 mg total) by mouth 3 (three) times daily as needed for Dizziness. 20 tablet 0    metoclopramide HCl (REGLAN) 10 MG tablet Take 1 tablet (10 mg total) by mouth every 6 (six) hours as needed (headache). 20 tablet 0        Ranjeet Ulrich II, MD  05/17/25 2474

## 2025-05-17 NOTE — FIRST PROVIDER EVALUATION
Emergency Department TeleTriage Encounter Note      CHIEF COMPLAINT    Chief Complaint   Patient presents with    Chest Pain     Mid sternal cp that began 1 day ago and has presently resolved. Pt reports feeling hot and waking up diaphoretic with onset of pain. All s/s have resolved except fatigue.        VITAL SIGNS   Initial Vitals [05/17/25 1721]   BP Pulse Resp Temp SpO2   (!) 169/93 63 19 97.5 °F (36.4 °C) 97 %      MAP       --            ALLERGIES    Review of patient's allergies indicates:   Allergen Reactions    Shellfish containing products        PROVIDER TRIAGE NOTE  This is a teletriage evaluation of a 64 y.o. female presenting to the ED complaining of chest pain. Pain first occurred last night. She had another episode happen at urgent care prior to arrival in the ED. She feels fatigued right now but denies chest pain and shortness of breath.    Patient is alert and oriented. She speaks in complete sentences. She is sitting upright in the chair in no distress.     Initial orders will be placed and care will be transferred to an alternate provider when patient is roomed for a full evaluation. Any additional orders and the final disposition will be determined by that provider.         ORDERS  Labs Reviewed   CBC W/ AUTO DIFFERENTIAL    Narrative:     The following orders were created for panel order CBC auto differential.  Procedure                               Abnormality         Status                     ---------                               -----------         ------                     CBC with Differential[[id number]]                                                        Please view results for these tests on the individual orders.   COMPREHENSIVE METABOLIC PANEL   TROPONIN I   TROPONIN I   B-TYPE NATRIURETIC PEPTIDE   CBC WITH DIFFERENTIAL       ED Orders (720h ago, onward)      Start Ordered     Status Ordering Provider    05/17/25 2029 05/17/25 1728  Troponin I #2  Once Timed         Ordered  BRYNN HO.    05/17/25 1729 05/17/25 1728  Vital signs  Every 15 min         Ordered BRYNN HO.    05/17/25 1729 05/17/25 1728  Cardiac Monitoring - Adult  Continuous        Comments: Notify Physician If:    Ordered BRYNN HO.    05/17/25 1729 05/17/25 1728  Pulse Oximetry Continuous  Continuous         Ordered BRYNN HO.    05/17/25 1729 05/17/25 1728  Diet NPO  Diet effective now         Ordered BRYNN HO.    05/17/25 1729 05/17/25 1728  Saline lock IV  Once         Ordered BRYNN HO.    05/17/25 1729 05/17/25 1728  EKG 12-lead  Once        Comments: Do not perform if previously done during this visit/ triage    Ordered BRYNN HO.    05/17/25 1729 05/17/25 1728  CBC auto differential  STAT         Ordered BRYNN HO.    05/17/25 1729 05/17/25 1728  Comprehensive metabolic panel  STAT         Ordered BRYNN HO.    05/17/25 1729 05/17/25 1728  Troponin I #1  STAT         Ordered BRYNN HO.    05/17/25 1729 05/17/25 1728  B-Type natriuretic peptide (BNP)  STAT         Ordered BRYNN HO.    05/17/25 1729 05/17/25 1728  X-Ray Chest AP Portable  1 time imaging         Ordered BRYNN HO.    05/17/25 1729 05/17/25 1728  CBC with Differential  PROCEDURE ONCE         Ordered BRYNN HO.              Virtual Visit Note: The provider triage portion of this emergency department evaluation and documentation was performed via [employer], a HIPAA-compliant telemedicine application, in concert with a tele-presenter in the room. A face to face patient evaluation with one of my colleagues will occur once the patient is placed in an emergency department room.      DISCLAIMER: This note was prepared with [employer] voice recognition transcription software. Garbled syntax, mangled pronouns, and other bizarre constructions may be attributed to that software system.

## 2025-05-18 LAB
OHS QRS DURATION: 94 MS
OHS QTC CALCULATION: 397 MS

## 2025-05-18 NOTE — ED NOTES
Pt. Present to the ED  with chest pain. Pt. Is a 64 yr. Old female.  Pt. Reports having chest pain & fatigue yesterday. Pt. States after she took a Asprin the chest pain went away. Pt. Has no chest pain at this time. Pt. Does report still feeling fatigue. Pt. Denies SOB,CP & abdominal pain. Pt. Is alert and vitals are with in normal limits. GCS 15    HEAD-HEENT,ABC's are intact  NECK-No JVD or trach deviation  SKIN-Warm & Dry  CHEST-CBBS=EXP,Denies pain  ABD-No distention or pain present  BACK-Denies pain  LOWER EXT.- FOM,+pulses & sensation  UPPER EXT.-FOM,+pulses & sensation

## 2025-05-18 NOTE — ED NOTES
05/17/25 2026   Safety Interventions   Quick Updates - Free Text NADN. Awake talking on the phone. Asking to eat. Will notify provider   Updates Patient is resting comfortably;Bed rails up - Call light within reach;Denies pain;Vitals stable   Patient Rounds bed wheels locked;bed in low position;call light in patient/parent reach;clutter free environment maintained;ID band on;visualized patient;placement of personal items at bedside

## 2025-07-30 ENCOUNTER — HOSPITAL ENCOUNTER (EMERGENCY)
Facility: OTHER | Age: 65
Discharge: HOME | End: 2025-07-31
Attending: EMERGENCY MEDICINE
Payer: MEDICAID

## 2025-07-30 DIAGNOSIS — R51.9 NONINTRACTABLE HEADACHE, UNSPECIFIED CHRONICITY PATTERN, UNSPECIFIED HEADACHE TYPE: Primary | ICD-10-CM

## 2025-07-30 LAB
CTP QC/QA: YES
CTP QC/QA: YES
POC MOLECULAR INFLUENZA A AGN: NEGATIVE
POC MOLECULAR INFLUENZA B AGN: NEGATIVE
SARS-COV-2 RDRP RESP QL NAA+PROBE: NEGATIVE

## 2025-07-30 PROCEDURE — 25000003 PHARM REV CODE 250

## 2025-07-30 PROCEDURE — 99283 EMERGENCY DEPT VISIT LOW MDM: CPT

## 2025-07-30 PROCEDURE — 87635 SARS-COV-2 COVID-19 AMP PRB: CPT

## 2025-07-30 RX ORDER — IBUPROFEN 600 MG/1
600 TABLET, FILM COATED ORAL
Status: COMPLETED | OUTPATIENT
Start: 2025-07-30 | End: 2025-07-30

## 2025-07-30 RX ORDER — ACETAMINOPHEN 500 MG
1000 TABLET ORAL
Status: COMPLETED | OUTPATIENT
Start: 2025-07-30 | End: 2025-07-30

## 2025-07-30 RX ADMIN — IBUPROFEN 600 MG: 600 TABLET ORAL at 11:07

## 2025-07-30 RX ADMIN — ACETAMINOPHEN 1000 MG: 500 TABLET, FILM COATED ORAL at 11:07

## 2025-07-30 NOTE — Clinical Note
"Antwan Huang"Jim was seen and treated in our emergency department on 7/30/2025.  She may return to work on 08/03/2025.       If you have any questions or concerns, please don't hesitate to call.      Debra ALARCON    "

## 2025-07-31 VITALS
WEIGHT: 170 LBS | RESPIRATION RATE: 18 BRPM | BODY MASS INDEX: 28.32 KG/M2 | HEIGHT: 65 IN | OXYGEN SATURATION: 99 % | SYSTOLIC BLOOD PRESSURE: 138 MMHG | DIASTOLIC BLOOD PRESSURE: 72 MMHG | TEMPERATURE: 98 F | HEART RATE: 62 BPM

## 2025-07-31 NOTE — DISCHARGE INSTRUCTIONS
Follow-Up Plan:  - Follow-up with primary care doctor within 3 - 5 days  - Additional testing and/or evaluation as directed by your primary doctor    Return to the Emergency Department for symptoms including but not limited to: worsening symptoms, shortness of breath or chest pain, vomiting with inability to hold down fluids, fevers greater than 100.4°F, passing out/fainting/unconsciousness, or other concerning symptoms.

## 2025-07-31 NOTE — ED PROVIDER NOTES
Encounter Date: 7/30/2025       History     Chief Complaint   Patient presents with    Sinusitis     Frontal headache, sneezing, lightheadedness, congestion onset yesterday. Last took mucinex, zyrtec, ibuprofen yesterday evening.      Patient is a 63 y/o male with no significant PMHx who arrives for evaluation who arrives for evaluation of headache.  Patient states having frontal headache since yesterday.  Denies any unilateral weakness, tingling, numbness.  Rhinorrhea, no purulent nasal secretions.  States having some sore throat.  Some coughing, nonproductive.  Denies any myalgias.  No fevers.  No toothache.  Soak some Zyrtec, ibuprofen and Mucinex yesterday with no significant improvement.      Review of patient's allergies indicates:   Allergen Reactions    Shellfish containing products      History reviewed. No pertinent past medical history.  History reviewed. No pertinent surgical history.  No family history on file.  Social History[1]  Review of Systems    Physical Exam     Initial Vitals [07/30/25 2230]   BP Pulse Resp Temp SpO2   (!) 158/84 72 16 98.3 °F (36.8 °C) 98 %      MAP       --         Physical Exam    Nursing note and vitals reviewed.  Constitutional: She appears well-developed and well-nourished. She is not diaphoretic. No distress.   HENT:   Head: Normocephalic and atraumatic.   Right Ear: External ear normal.   Left Ear: External ear normal.   Nose: Nose normal.   No facial tenderness to palpation.   Eyes: Conjunctivae and EOM are normal. Pupils are equal, round, and reactive to light. Right eye exhibits no discharge. Left eye exhibits no discharge. No scleral icterus.   Neck: Neck supple.   Cardiovascular:  Normal rate, regular rhythm and normal heart sounds.           No murmur heard.  Pulmonary/Chest: Breath sounds normal. No stridor. No respiratory distress.   Abdominal: Abdomen is soft. She exhibits no distension. There is no abdominal tenderness.   Musculoskeletal:         General: No  tenderness or edema. Normal range of motion.      Cervical back: Neck supple.     Neurological: She is alert and oriented to person, place, and time. She has normal strength. No cranial nerve deficit or sensory deficit. GCS score is 15. GCS eye subscore is 4. GCS verbal subscore is 5. GCS motor subscore is 6.   General: Patient is alert, attentive, and oriented.  Speech is clear and fluent. PERRL and there is no facial droop. There is no pronator drift of out-stretched arms.  Motor: Muscle bulk and tone are normal. Strength is full bilaterally in UEs and LEs  Sensation: SILT in all four extremities.  Coordination: No dysmetria on finger-to-nose. Gait is steady with normal steps.     Skin: Skin is warm and dry. Capillary refill takes less than 2 seconds. No rash and no abscess noted. No erythema. No pallor.   Psychiatric: She has a normal mood and affect.         ED Course   Procedures  Labs Reviewed   POCT INFLUENZA A/B MOLECULAR       Result Value    POC Molecular Influenza A Ag Negative      POC Molecular Influenza B Ag Negative       Acceptable Yes     SARS-COV-2 RDRP GENE    POC Rapid COVID Negative       Acceptable Yes            Imaging Results    None          Medications   acetaminophen tablet 1,000 mg (1,000 mg Oral Given 7/30/25 2317)   ibuprofen tablet 600 mg (600 mg Oral Given 7/30/25 2317)     Medical Decision Making  Patient is a 64 year old female  with history as stated above.    Differential diagnosis includes, but is not limited to:    -viral URI  -tension headache  -stroke (do not suspect given that patient has a reassuring neurologic exam, reassuring history with no red flags)    Management:    Ordered Tylenol ibuprofen for symptomatic management.  Ordered flu and COVID testing.  Patient clinically well-appearing.  Reassuring physical exam.  Patient signed out to ED attending.        Amount and/or Complexity of Data Reviewed  Labs: ordered.    Risk  OTC  drugs.  Prescription drug management.                                          Clinical Impression:  Final diagnoses:  [R51.9] Nonintractable headache, unspecified chronicity pattern, unspecified headache type (Primary)              [facility]  [facility] [place] Module  Jul 31 2025 12:01 AM [Priyank Leblanc]  Data:  - Test/documents: 2 tests ordered  Additional encounter diagnoses: Nonintractable headache, unspecified chronicity pattern, unspecified headache type  Risk: ibuprofen tablet (Rx drug management)             [1]   Social History  Tobacco Use    Smoking status: Never    Smokeless tobacco: Never   Vaping Use    Vaping status: Never Used   Substance Use Topics    Alcohol use: Yes     Comment: occasionally     Drug use: No        Priyank Leblanc MD  Resident  07/31/25 0001